# Patient Record
(demographics unavailable — no encounter records)

---

## 2025-05-16 NOTE — DATA REVIEWED
[FreeTextEntry1] : 1. prior notes reviewed 2. history obtained from primary caregiver as independent historian due to patient's developmental age and limited recall

## 2025-05-16 NOTE — HISTORY OF PRESENT ILLNESS
[de-identified] : 3 year old male presents for follow up. Using flonase with relief snoring and nasal congestion has improved. No longer mouth breathing.  Sleeping well.  No recent ear infections No concerns for hearing.

## 2025-06-27 NOTE — REVIEW OF SYSTEMS
[Negative] : Heme/Lymph [de-identified] : as per HPI  [de-identified] : as per HPI  [de-identified] : as per HPI

## 2025-06-27 NOTE — DATA REVIEWED
[FreeTextEntry1] : history obtained from primary caregiver as independent historian due to patient's developmental age and limited recall  prior notes reviewed

## 2025-06-27 NOTE — HISTORY OF PRESENT ILLNESS
[de-identified] :  3 year old male  here with parents for follow up.  Currently has cold symptoms, on Promethazine day 5 of 7, snoring mildly with cold but otherwise improved Using flonase with relief Snoring and nasal congestion has improved. No longer mouth breathing. Sleeping well.  No recent ear infections No recent throat infections  No concerns for hearing.  Passed [facility]